# Patient Record
Sex: MALE | Race: AMERICAN INDIAN OR ALASKA NATIVE
[De-identification: names, ages, dates, MRNs, and addresses within clinical notes are randomized per-mention and may not be internally consistent; named-entity substitution may affect disease eponyms.]

---

## 2020-10-12 ENCOUNTER — HOSPITAL ENCOUNTER (EMERGENCY)
Dept: HOSPITAL 5 - ED | Age: 30
Discharge: LEFT BEFORE DISCHARGE | End: 2020-10-12
Payer: SELF-PAY

## 2020-10-12 DIAGNOSIS — Z53.21: ICD-10-CM

## 2020-10-12 DIAGNOSIS — I10: Primary | ICD-10-CM

## 2021-09-26 ENCOUNTER — HOSPITAL ENCOUNTER (EMERGENCY)
Dept: HOSPITAL 5 - ED | Age: 31
LOS: 1 days | Discharge: HOME | End: 2021-09-27
Payer: SELF-PAY

## 2021-09-26 DIAGNOSIS — M62.830: Primary | ICD-10-CM

## 2021-09-26 DIAGNOSIS — M54.5: ICD-10-CM

## 2021-09-26 DIAGNOSIS — J20.9: ICD-10-CM

## 2021-09-26 DIAGNOSIS — K59.00: ICD-10-CM

## 2021-09-26 DIAGNOSIS — Z79.899: ICD-10-CM

## 2021-09-26 PROCEDURE — 99283 EMERGENCY DEPT VISIT LOW MDM: CPT

## 2021-09-26 PROCEDURE — 71046 X-RAY EXAM CHEST 2 VIEWS: CPT

## 2021-09-27 VITALS — DIASTOLIC BLOOD PRESSURE: 96 MMHG | SYSTOLIC BLOOD PRESSURE: 142 MMHG

## 2021-09-27 NOTE — XRAY REPORT
CHEST 2 VIEWS 



INDICATION / CLINICAL INFORMATION: COUGH



STUDY TIME: 0052



COMPARISON: None available.



FINDINGS:



SUPPORT DEVICES: None.



HEART / MEDIASTINUM: No significant abnormality. 



LUNGS / PLEURA: No significant pulmonary or pleural abnormality. No pneumothorax. 



ADDITIONAL FINDINGS: No significant additional findings.





Signer Name: Tom Drew MD 

Signed: 9/27/2021 1:04 AM

Workstation Name: [id number]

## 2021-09-27 NOTE — EMERGENCY DEPARTMENT REPORT
ED General Adult HPI





- General


Chief complaint: Back Pain/Injury


Stated complaint: BACK PAIN/CONSTIPATION/HIGH BP


Source: patient


Mode of arrival: Ambulatory


Limitations: No Limitations





- History of Present Illness


Initial comments: 





Patient is a 31-year-old male with no past medical history except recent 

diagnosis of COVID-19 viral infection who presents to the ED with complaint of 

lack of appetite, persistent dry cough, low back pain and constipation for the 

last 1 week.  Patient states that he has been drinking fluids and eating only 

soup because of lack of appetite and loss of taste sensation.  Patient also 

states that the pain in his low back is persistent especially with movement.  

Patient denies fever, chills, dysuria, urinary frequency and urgency, chest 

pain, shortness of breath, dizziness, headache, abdominal pain or change in 

vision.


MD Complaint: cough, constipation and back pain


-: Sudden, week(s) (1)


Location: chest, back, abdomen


Radiation: non-radiation


Severity scale (0 -10): 4


Quality: aching, constant


Consistency: constant


Improves with: none


Worsens with: movement


Associated Symptoms: denies other symptoms, cough, loss of appetite, malaise.  

denies: confusion, chest pain, diaphoresis, fever/chills, headaches, 

nausea/vomiting, rash, seizure, shortness of breath, syncope, weakness


Treatments Prior to Arrival: none





- Related Data


                                  Previous Rx's











 Medication  Instructions  Recorded  Last Taken  Type


 


Benzonatate [Tessalon Perles] 100 mg PO Q8HR #24 capsule 21 Unknown Rx


 


Docusate Sodium [Dok] 100 mg PO DAILY #30 tablet 21 Unknown Rx


 


Ibuprofen [Motrin] 800 mg PO Q8HR PRN #30 tablet 21 Unknown Rx


 


Magnesium Citrate 295 ml PO ONCE #1 bottle 21 Unknown Rx











                                    Allergies











Allergy/AdvReac Type Severity Reaction Status Date / Time


 


No Known Allergies Allergy   Unverified 21 00:38














ED Review of Systems


ROS: 


Stated complaint: BACK PAIN/CONSTIPATION/HIGH BP


Other details as noted in HPI





Constitutional: denies: chills, fever


Eyes: denies: eye pain, eye discharge, vision change


ENT: denies: ear pain, throat pain


Respiratory: cough.  denies: shortness of breath, wheezing


Cardiovascular: denies: chest pain, palpitations


Endocrine: no symptoms reported


Gastrointestinal: constipation.  denies: abdominal pain, nausea, diarrhea


Genitourinary: denies: urgency, dysuria


Musculoskeletal: back pain, arthralgia, myalgia.  denies: joint swelling


Skin: denies: rash, lesions


Neurological: denies: headache, weakness, paresthesias


Psychiatric: denies: anxiety, depression


Hematological/Lymphatic: denies: easy bleeding, easy bruising





ED Past Medical Hx





- Social History


Smoking Status: Never Smoker


Substance Use Type: None





- Medications


Home Medications: 


                                Home Medications











 Medication  Instructions  Recorded  Confirmed  Last Taken  Type


 


Benzonatate [Tessalon Perles] 100 mg PO Q8HR #24 capsule 21  Unknown Rx


 


Docusate Sodium [Dok] 100 mg PO DAILY #30 tablet 21  Unknown Rx


 


Ibuprofen [Motrin] 800 mg PO Q8HR PRN #30 tablet 21  Unknown Rx


 


Magnesium Citrate 295 ml PO ONCE #1 bottle 21  Unknown Rx














ED Physical Exam





- General


Limitations: No Limitations


General appearance: alert, in no apparent distress





- Head


Head exam: Present: atraumatic, normocephalic, normal inspection





- Eye


Eye exam: Present: normal appearance, PERRL, EOMI


Pupils: Present: normal accommodation





- ENT


ENT exam: Present: normal exam, normal orophraynx, mucous membranes moist, TM's 

normal bilaterally, normal external ear exam





- Neck


Neck exam: Present: normal inspection, full ROM





- Respiratory


Respiratory exam: Present: normal lung sounds bilaterally.  Absent: respiratory 

distress, wheezes, rales, rhonchi, chest wall tenderness, decreased breath 

sounds, prolonged expiratory





- Cardiovascular


Cardiovascular Exam: Present: regular rate, normal rhythm, normal heart sounds. 

 Absent: systolic murmur, diastolic murmur, rubs, gallop





- GI/Abdominal


GI/Abdominal exam: Present: soft, normal bowel sounds.  Absent: tenderness, 

guarding, rebound, hyperactive bowel sounds, hypoactive bowel sounds, 

organomegaly





- Extremities Exam


Extremities exam: Present: normal inspection, full ROM, normal capillary refill





- Back Exam


Back exam: Present: normal inspection, full ROM, tenderness (Palpable lumbosa

cral paraspinal musculoskeletal tenderness), muscle spasm.  Absent: CVA 

tenderness (R), CVA tenderness (L), paraspinal tenderness, vertebral tenderness





- Neurological Exam


Neurological exam: Present: alert, oriented X3, CN II-XII intact, normal gait, 

reflexes normal





- Psychiatric


Psychiatric exam: Present: normal affect, normal mood





- Skin


Skin exam: Present: warm, dry, intact, normal color.  Absent: rash





ED Course





                                   Vital Signs











  21





  00:28 00:33


 


Temperature 98.0 F 98.6 F


 


Respiratory  18





Rate  


 


Blood Pressure  151/90














ED Medical Decision Making





- Radiology Data


Radiology results: report reviewed, image reviewed





[facility]  


                                     [address] [place] [address]  


 


                                            XRay Report   


                                               Signed  


 


Patient: KING ABDIAZIZ CARSON                                                        

        MR#: [id number]  


[id number]          


: 1990                                                                

Acct:[id number]      


 


Age/Sex: 31 / M                                                                

ADM Date: 21     


 


Loc: ED       


Attending Dr:   


 


 


Ordering Physician: JOSEFINA RYAN  


Date of Service: 21  


Procedure(s): XR chest routine 2V  


Accession Number(s): [id number]  


 


cc: JOSEFINA RYAN   


 


Fluoro Time In Minutes:   


 


CHEST 2 VIEWS   


 


 INDICATION / CLINICAL INFORMATION: COUGH  


 


 STUDY TIME: 52  


 


 COMPARISON: None available.  


 


 FINDINGS:  


 


 SUPPORT DEVICES: None.  


 


 HEART / MEDIASTINUM: No significant abnormality.   


 


 LUNGS / PLEURA: No significant pulmonary or pleural abnormality. No 

pneumothorax.   


 


 ADDITIONAL FINDINGS: No significant additional findings.  


 


 


 Signer Name: Tom Drew MD   


 Signed: 2021 1:04 AM  


 Workstation Name: [id number]   


 


 


Transcribed By: GJ  


Dictated By: Tom Drew MD  


Electronically Authenticated By: Tom Drew MD    


Signed Date/Time: 21                                


 


 


 


DD/DT: 21                                                            

  


TD/TT:





























- Medical Decision Making





This is a 31-year-old male with no past medical history except recent diagnosis 

of COVID-19 viral infection who presents to the ED with complaint of lack of 

appetite, persistent dry cough, low back pain and constipation for the last 1 

week.  Patient states that he has been drinking fluids and eating only soup 

because of lack of appetite and loss of taste sensation.  Patient also states 

that the pain in his low back is persistent especially with movement.  In the 

ED, patient is alert and oriented x3 and is not in any distress.  Chest x-ray 

showed no acute cardiopulmonary abnormalities or pneumonitis.  Patient was 

therefore discharged home on medications for pain of his back and constipation 

and was advised to follow-up with his primary care physician in 5 to 7 days for 

reevaluation or return to the ED immediately if symptoms get worse.





- Differential Diagnosis


Constipation; COVID-19; muscle spasm; bronchitis


Critical care attestation.: 


If time is entered above; I have spent that time in minutes in the direct care 

of this critically ill patient, excluding procedure time.








ED Disposition


Clinical Impression: 


 Spasm of muscle of lower back





Acute bronchitis


Qualifiers:


 Bronchitis organism: other organism Qualified Code(s): J20.8 - Acute bronchitis

 due to other specified organisms





Constipation


Qualifiers:


 Constipation type: other constipation type Qualified Code(s): K59.09 - Other 

constipation





Disposition:  HOME / SELF CARE / HOMELESS


Is pt being admited?: No


Does the pt Need Aspirin: No


Condition: Stable


Instructions:  Acute Bronchitis (ED), Muscle Cramps and Spasms, Easy-to-Read, 

Acute Bronchitis, Adult, Easy-to-Read, Constipation, Adult, Easy-to-Read


Additional Instructions: 


Chest x-ray showed no acute cardiopulmonary abnormalities or pneumonitis.  

Therefore take medications with food, drink plenty of fluids and follow-up with 

your primary care physician in 5 to 7 days for reevaluation.  Return to the ED 

immediately if symptoms get worse


Prescriptions: 


Docusate Sodium [Dok] 100 mg PO DAILY #30 tablet


Magnesium Citrate 295 ml PO ONCE #1 bottle


Ibuprofen [Motrin] 800 mg PO Q8HR PRN #30 tablet


 PRN Reason: Pain , Severe (7-10)


Benzonatate [Tessalon Perles] 100 mg PO Q8HR #24 capsule


Referrals: 


[facility] [Provider Group] - 7-10 days


Time of Disposition: 02:35


Print Language: ENGLISH

## 2021-09-29 ENCOUNTER — HOSPITAL ENCOUNTER (EMERGENCY)
Dept: HOSPITAL 5 - ED | Age: 31
Discharge: HOME | End: 2021-09-29
Payer: SELF-PAY

## 2021-09-29 VITALS — SYSTOLIC BLOOD PRESSURE: 144 MMHG | DIASTOLIC BLOOD PRESSURE: 99 MMHG

## 2021-09-29 DIAGNOSIS — Z79.899: ICD-10-CM

## 2021-09-29 DIAGNOSIS — K59.00: ICD-10-CM

## 2021-09-29 DIAGNOSIS — R10.84: ICD-10-CM

## 2021-09-29 DIAGNOSIS — J68.2: Primary | ICD-10-CM

## 2021-09-29 LAB
ALBUMIN SERPL-MCNC: 4.3 G/DL (ref 3.9–5)
ALT SERPL-CCNC: 26 UNITS/L (ref 7–56)
BASOPHILS # (AUTO): 0.1 K/MM3 (ref 0–0.1)
BASOPHILS NFR BLD AUTO: 0.5 % (ref 0–1.8)
BILIRUB DIRECT SERPL-MCNC: < 0.2 MG/DL (ref 0–0.2)
BILIRUB UR QL STRIP: (no result)
BLOOD UR QL VISUAL: (no result)
BUN SERPL-MCNC: 6 MG/DL (ref 9–20)
BUN/CREAT SERPL: 12 %
CALCIUM SERPL-MCNC: 9.2 MG/DL (ref 8.4–10.2)
EOSINOPHIL # BLD AUTO: 0 K/MM3 (ref 0–0.4)
EOSINOPHIL NFR BLD AUTO: 0.4 % (ref 0–4.3)
HCT VFR BLD CALC: 42.6 % (ref 35.5–45.6)
HEMOLYSIS INDEX: 9
HGB BLD-MCNC: 15 GM/DL (ref 11.8–15.2)
LYMPHOCYTES # BLD AUTO: 2 K/MM3 (ref 1.2–5.4)
LYMPHOCYTES NFR BLD AUTO: 21.6 % (ref 13.4–35)
MCHC RBC AUTO-ENTMCNC: 35 % (ref 32–34)
MCV RBC AUTO: 87 FL (ref 84–94)
MONOCYTES # (AUTO): 0.8 K/MM3 (ref 0–0.8)
MONOCYTES % (AUTO): 8.4 % (ref 0–7.3)
PH UR STRIP: 7 [PH] (ref 5–7)
PLATELET # BLD: 241 K/MM3 (ref 140–440)
PROT UR STRIP-MCNC: (no result) MG/DL
RBC # BLD AUTO: 4.93 M/MM3 (ref 3.65–5.03)
RBC #/AREA URNS HPF: < 1 /HPF (ref 0–6)
UROBILINOGEN UR-MCNC: < 2 MG/DL (ref ?–2)
WBC #/AREA URNS HPF: < 1 /HPF (ref 0–6)

## 2021-09-29 PROCEDURE — 96361 HYDRATE IV INFUSION ADD-ON: CPT

## 2021-09-29 PROCEDURE — 82271 OCCULT BLOOD OTHER SOURCES: CPT

## 2021-09-29 PROCEDURE — 80076 HEPATIC FUNCTION PANEL: CPT

## 2021-09-29 PROCEDURE — 85025 COMPLETE CBC W/AUTO DIFF WBC: CPT

## 2021-09-29 PROCEDURE — 81001 URINALYSIS AUTO W/SCOPE: CPT

## 2021-09-29 PROCEDURE — 80048 BASIC METABOLIC PNL TOTAL CA: CPT

## 2021-09-29 PROCEDURE — 83690 ASSAY OF LIPASE: CPT

## 2021-09-29 PROCEDURE — 96374 THER/PROPH/DIAG INJ IV PUSH: CPT

## 2021-09-29 PROCEDURE — 99284 EMERGENCY DEPT VISIT MOD MDM: CPT

## 2021-09-29 PROCEDURE — 74177 CT ABD & PELVIS W/CONTRAST: CPT

## 2021-09-29 PROCEDURE — 36415 COLL VENOUS BLD VENIPUNCTURE: CPT

## 2021-09-29 NOTE — CAT SCAN REPORT
CT ABDOMEN AND PELVIS WITH CONTRAST



INDICATION / CLINICAL INFORMATION: Abdominal pain/constipation.



TECHNIQUE: Axial CT images were obtained through the abdomen and pelvis after Isovue-300, 100 cc IV c
ontrast.  All CT scans at this location are performed using CT dose reduction for ALARA by means of a
utomated exposure control. 



COMPARISON: None available.



FINDINGS:



LOWER CHEST: Mild patchy nodularity at the lung bases has an inflammatory appearance.

LIVER: Mild diffuse fatty infiltration.

GALLBLADDER: No significant abnormality.  

BILE DUCTS: No significant abnormality.

PANCREAS: No significant abnormality.

SPLEEN: No significant abnormality.

ADRENALS: No significant abnormality.

RIGHT KIDNEY / URETER: No significant abnormality.

LEFT KIDNEY / URETER: No significant abnormality.



STOMACH / SMALL BOWEL: No significant abnormality. 

COLON: No significant abnormality. 

APPENDIX: No significant abnormality.  

PERITONEUM: No free fluid. No free air. No fluid collection.

LYMPH NODES: No significant adenopathy.

VASCULAR STRUCTURES: No significant abnormality. 



URINARY BLADDER: No significant abnormality.

REPRODUCTIVE ORGANS: No significant abnormality.



ADDITIONAL FINDINGS: None.



SKELETAL SYSTEM: No significant abnormality.



IMPRESSION:

1. Negative for obstruction or localized inflammation of the abdomen or pelvis.

2. Patchy nodularity at the lung bases is favored to be inflammatory.



Signer Name: Akira Nava MD 

Signed: 9/29/2021 5:41 PM

Workstation Name: [id number]

## 2021-09-29 NOTE — EMERGENCY DEPARTMENT REPORT
<PIPPA BOB - Last Filed: 09/29/21 17:17>





ED GI Bleed HPI





- General


Chief complaint: Abdominal Pain


Stated complaint: RECTAL BLEEDING


Time Seen by Provider: 09/29/21 15:07


Source: patient


Mode of arrival: Ambulatory


Limitations: No Limitations





- History of Present Illness


Initial comments: 


31-year-old male presents to the ER today with complaints of constipation, 

abdominal pain or rectal bleeding.  Patient states that he has been having 

issues with bowel movement since last week Thursday.  Patient states that he 

typically has a bowel movement every day, but since last week Thursday he has 

been feeling constipated.  He states that when he gets the urge to go, only 

small amount of stool comes out, or sometimes nothing comes out only gas.  

Patient states that he has been straining.  He states that the stool has not 

been hard.  He states that he has been noticing bright red blood, and sometimes 

burgundy colored blood on the tissue when he wipes after he has a bowel movement

which he thinks could be related to his hemorrhoids because he was diagnosed by 

a.  He reports associated rectal discomfort and swelling.  He reports associated

abdominal discomfort especially to the right and left side of his abdomen and he

states that the pain gets worse when he gets the urge to have a bowel movement. 

He reports associated low back pain.  Patient states that he was here 2 days ago

for URI symptoms in addition to his complaint of constipation and he was 

prescribed stool softeners which she has been taking but without much relief of 

his symptoms.  He denies any dysuria, hematuria or urgency.  He denies any fever

or chills.  He denies any nausea or vomiting.  He denies any history of 

abdominal surgeries.  He states that he is scheduled to see a GI specialist in 

December for his hemorrhoids.


MD complaint: blood on toilet paper, other (Constipation, abdominal pain )


-: Gradual





- Related Data


                                  Previous Rx's











 Medication  Instructions  Recorded  Last Taken  Type


 


Benzonatate [Tessalon Perles] 100 mg PO Q8HR #24 capsule 09/27/21 Unknown Rx


 


Docusate Sodium [Dok] 100 mg PO DAILY #30 tablet 09/27/21 Unknown Rx


 


Ibuprofen [Motrin] 800 mg PO Q8HR PRN #30 tablet 09/27/21 Unknown Rx


 


Magnesium Citrate 295 ml PO ONCE #1 bottle 09/27/21 Unknown Rx


 


Azithromycin [Zithromax Z-LIBORIO] 0 mg PO DAILY #6 tab 09/29/21 Unknown Rx











                                    Allergies











Allergy/AdvReac Type Severity Reaction Status Date / Time


 


No Known Allergies Allergy   Verified 09/29/21 14:54














ED Review of Systems


Comment: All other systems reviewed and negative


Constitutional: denies: chills, fever


Eyes: denies: eye pain, eye discharge, vision change


ENT: denies: ear pain, throat pain


Respiratory: denies: cough, shortness of breath, SOB with exertion, SOB at rest,

wheezing


Cardiovascular: denies: chest pain, palpitations, dyspnea on exertion, edema, 

syncope, paroxysmal nocturnal dyspnea


Gastrointestinal: abdominal pain, constipation, hematochezia (Rectal pain, 

swelling), other.  denies: nausea, vomiting, diarrhea, hematemesis, melena


Genitourinary: denies: as per HPI, urgency, dysuria, frequency, hematuria, 

discharge, testicular pain, testicular mass


Musculoskeletal: denies: back pain, joint swelling, arthralgia


Skin: denies: rash, lesions, change in color, change in hair/nails, pruritus


Neurological: denies: headache, weakness, numbness, paresthesias, confusion, 

abnormal gait, vertigo


Psychiatric: denies: anxiety, depression, auditory hallucinations, visual 

hallucinations, homicidal thoughts, suicidal thoughts


Hematological/Lymphatic: denies: easy bleeding, easy bruising, swollen glands





ED Past Medical Hx





- Social History


Smoking Status: Never Smoker


Substance Use Type: None





- Medications


Home Medications: 


                                Home Medications











 Medication  Instructions  Recorded  Confirmed  Last Taken  Type


 


Benzonatate [Tessalon Perles] 100 mg PO Q8HR #24 capsule 09/27/21  Unknown Rx


 


Docusate Sodium [Dok] 100 mg PO DAILY #30 tablet 09/27/21  Unknown Rx


 


Ibuprofen [Motrin] 800 mg PO Q8HR PRN #30 tablet 09/27/21  Unknown Rx


 


Magnesium Citrate 295 ml PO ONCE #1 bottle 09/27/21  Unknown Rx


 


Azithromycin [Zithromax Z-LIBORIO] 0 mg PO DAILY #6 tab 09/29/21  Unknown Rx














ED Physical Exam





- General


Limitations: No Limitations


General appearance: alert, in no apparent distress





- Head


Head exam: Present: atraumatic, normocephalic, normal inspection





- Eye


Eye exam: Present: normal appearance, PERRL, EOMI


Pupils: Present: normal accommodation





- ENT


ENT exam: Present: normal exam, mucous membranes moist





- Neck


Neck exam: Present: normal inspection, full ROM





- Respiratory


Respiratory exam: Present: normal lung sounds bilaterally.  Absent: respiratory 

distress, wheezes, rales, rhonchi





- Cardiovascular


Cardiovascular Exam: Present: regular rate, normal rhythm, normal heart sounds





- GI/Abdominal


GI/Abdominal exam: Present: soft, tenderness.  Absent: distended, guarding, 

rebound, rigid





- Rectal


Rectal exam: Present: normal rectal tone, heme (+) stool, other (Chaperone 

present ).  Absent: fecal impaction, hemorrhoids, mass, tenderness





- Neurological Exam


Neurological exam: Present: alert, oriented X3, CN II-XII intact, normal gait





- Psychiatric


Psychiatric exam: Present: normal affect, normal mood





- Skin


Skin exam: Present: intact





ED Medical Decision Making





- Lab Data


Result diagrams: 


                                 09/29/21 15:38





                                 09/29/21 15:38





- Medical Decision Making


The patient's care has been transferred to and accepted by[Filipe GONZALEZ].  We 

discussed: The patient's chief complaints; labs and imaging that have been com

pleted and those that are still pending; any treatment provided and the 

patient's response to treatment; any significant change in condition; the 

treatment plan prior to the transfer of care.  The accepting provider will 

follow up on all pending labs and imaging and make any necessary changes to the 

current impression and/or treatment plan.  The accepting physician/midlevel is 

now responsible for the patient's care and final disposition.








ED Disposition


Clinical Impression: 


 Inflammation of lung





Constipation


Qualifiers:


 Constipation type: unspecified constipation type Qualified Code(s): K59.00 - 

Constipation, unspecified





Abdominal pain


Qualifiers:


 Abdominal location: generalized Qualified Code(s): R10.84 - Generalized 

abdominal pain





Disposition: 01 HOME / SELF CARE / HOMELESS


Condition: Stable


Instructions:  Community-Acquired Pneumonia, Adult, Constipation, Adult


Prescriptions: 


Azithromycin [Zithromax Z-LIBORIO] 0 mg PO DAILY #6 tab


Referrals: 


[facility] [Provider Group] - 3-5 Days


Forms:  Work/School Release Form(ED)





<FILIPE HARDING - Last Filed: 09/29/21 20:39>





ED Review of Systems


ROS: 


Stated complaint: RECTAL BLEEDING


Other details as noted in HPI








ED Course





                                   Vital Signs











  09/29/21





  14:52


 


Temperature 97.7 F


 


Pulse Rate 85


 


Respiratory 16





Rate 


 


Blood Pressure 144/99


 


O2 Sat by Pulse 97





Oximetry 














ED Medical Decision Making





- Lab Data


Result diagrams: 


                                 09/29/21 15:38





                                 09/29/21 15:38








                                   Lab Results











  09/29/21 09/29/21 09/29/21 Range/Units





  15:38 15:38 15:38 


 


WBC  9.2    (4.5-11.0)  K/mm3


 


RBC  4.93    (3.65-5.03)  M/mm3


 


Hgb  15.0    (11.8-15.2)  gm/dl


 


Hct  42.6    (35.5-45.6)  %


 


MCV  87    (84-94)  fl


 


MCH  30    (28-32)  pg


 


MCHC  35 H    (32-34)  %


 


RDW  13.0 L    (13.2-15.2)  %


 


Plt Count  241    (140-440)  K/mm3


 


Lymph % (Auto)  21.6    (13.4-35.0)  %


 


Mono % (Auto)  8.4 H    (0.0-7.3)  %


 


Eos % (Auto)  0.4    (0.0-4.3)  %


 


Baso % (Auto)  0.5    (0.0-1.8)  %


 


Lymph # (Auto)  2.0    (1.2-5.4)  K/mm3


 


Mono # (Auto)  0.8    (0.0-0.8)  K/mm3


 


Eos # (Auto)  0.0    (0.0-0.4)  K/mm3


 


Baso # (Auto)  0.1    (0.0-0.1)  K/mm3


 


Seg Neutrophils %  69.1    (40.0-70.0)  %


 


Seg Neutrophils #  6.3    (1.8-7.7)  K/mm3


 


Sodium   134 L   (137-145)  mmol/L


 


Potassium   3.3 L   (3.6-5.0)  mmol/L


 


Chloride   93.8 L   ()  mmol/L


 


Carbon Dioxide   29   (22-30)  mmol/L


 


Anion Gap   15   mmol/L


 


BUN   6 L   (9-20)  mg/dL


 


Creatinine   0.5 L   (0.8-1.3)  mg/dL


 


Estimated GFR   > 60   ml/min


 


BUN/Creatinine Ratio   12   %


 


Glucose   104 H   ()  mg/dL


 


Calcium   9.2   (8.4-10.2)  mg/dL


 


Total Bilirubin   0.60   (0.1-1.2)  mg/dL


 


Direct Bilirubin   < 0.2   (0-0.2)  mg/dL


 


Indirect Bilirubin   0.4   mg/dL


 


AST   20   (5-40)  units/L


 


ALT   26   (7-56)  units/L


 


Alkaline Phosphatase   60   ()  units/L


 


Total Protein   8.1   (6.3-8.2)  g/dL


 


Albumin   4.3   (3.9-5)  g/dL


 


Albumin/Globulin Ratio   1.1   %


 


Lipase    30  (13-60)  units/L


 


Urine Color     (Yellow)  


 


Urine Turbidity     (Clear)  


 


Urine pH     (5.0-7.0)  


 


Ur Specific Gravity     (1.003-1.030)  


 


Urine Protein     (Negative)  mg/dL


 


Urine Glucose (UA)     (Negative)  mg/dL


 


Urine Ketones     (Negative)  mg/dL


 


Urine Blood     (Negative)  


 


Urine Nitrite     (Negative)  


 


Urine Bilirubin     (Negative)  


 


Urine Urobilinogen     (<2.0)  mg/dL


 


Ur Leukocyte Esterase     (Negative)  


 


Urine WBC (Auto)     (0.0-6.0)  /HPF


 


Urine RBC (Auto)     (0.0-6.0)  /HPF














  09/29/21 Range/Units





  15:59 


 


WBC   (4.5-11.0)  K/mm3


 


RBC   (3.65-5.03)  M/mm3


 


Hgb   (11.8-15.2)  gm/dl


 


Hct   (35.5-45.6)  %


 


MCV   (84-94)  fl


 


MCH   (28-32)  pg


 


MCHC   (32-34)  %


 


RDW   (13.2-15.2)  %


 


Plt Count   (140-440)  K/mm3


 


Lymph % (Auto)   (13.4-35.0)  %


 


Mono % (Auto)   (0.0-7.3)  %


 


Eos % (Auto)   (0.0-4.3)  %


 


Baso % (Auto)   (0.0-1.8)  %


 


Lymph # (Auto)   (1.2-5.4)  K/mm3


 


Mono # (Auto)   (0.0-0.8)  K/mm3


 


Eos # (Auto)   (0.0-0.4)  K/mm3


 


Baso # (Auto)   (0.0-0.1)  K/mm3


 


Seg Neutrophils %   (40.0-70.0)  %


 


Seg Neutrophils #   (1.8-7.7)  K/mm3


 


Sodium   (137-145)  mmol/L


 


Potassium   (3.6-5.0)  mmol/L


 


Chloride   ()  mmol/L


 


Carbon Dioxide   (22-30)  mmol/L


 


Anion Gap   mmol/L


 


BUN   (9-20)  mg/dL


 


Creatinine   (0.8-1.3)  mg/dL


 


Estimated GFR   ml/min


 


BUN/Creatinine Ratio   %


 


Glucose   ()  mg/dL


 


Calcium   (8.4-10.2)  mg/dL


 


Total Bilirubin   (0.1-1.2)  mg/dL


 


Direct Bilirubin   (0-0.2)  mg/dL


 


Indirect Bilirubin   mg/dL


 


AST   (5-40)  units/L


 


ALT   (7-56)  units/L


 


Alkaline Phosphatase   ()  units/L


 


Total Protein   (6.3-8.2)  g/dL


 


Albumin   (3.9-5)  g/dL


 


Albumin/Globulin Ratio   %


 


Lipase   (13-60)  units/L


 


Urine Color  Colorless  (Yellow)  


 


Urine Turbidity  Clear  (Clear)  


 


Urine pH  7.0  (5.0-7.0)  


 


Ur Specific Gravity  1.001 L  (1.003-1.030)  


 


Urine Protein  <15 mg/dl  (Negative)  mg/dL


 


Urine Glucose (UA)  Neg  (Negative)  mg/dL


 


Urine Ketones  Neg  (Negative)  mg/dL


 


Urine Blood  Neg  (Negative)  


 


Urine Nitrite  Neg  (Negative)  


 


Urine Bilirubin  Neg  (Negative)  


 


Urine Urobilinogen  < 2.0  (<2.0)  mg/dL


 


Ur Leukocyte Esterase  Neg  (Negative)  


 


Urine WBC (Auto)  < 1.0  (0.0-6.0)  /HPF


 


Urine RBC (Auto)  < 1.0  (0.0-6.0)  /HPF














- Radiology Data


Radiology results: report reviewed





 


CT ABDOMEN AND PELVIS WITH CONTRAST  


 


 INDICATION / CLINICAL INFORMATION: Abdominal pain/constipation.  


 


 TECHNIQUE: Axial CT images were obtained through the abdomen and pelvis after 

Isovue-300, 100 cc IV


contrast.  All CT scans at this location are performed using CT dose reduction 

for ALARA by means of


automated exposure control.   


 


 COMPARISON: None available.  


 


 FINDINGS:  


 


 LOWER CHEST: Mild patchy nodularity at the lung bases has an inflammatory 

appearance.  


 LIVER: Mild diffuse fatty infiltration.  


 GALLBLADDER: No significant abnormality.    


 BILE DUCTS: No significant abnormality.  


 PANCREAS: No significant abnormality.  


 SPLEEN: No significant abnormality.  


 ADRENALS: No significant abnormality.  


 RIGHT KIDNEY / URETER: No significant abnormality.  


 LEFT KIDNEY / URETER: No significant abnormality.  


 


 STOMACH / SMALL BOWEL: No significant abnormality.   


 COLON: No significant abnormality.   


 APPENDIX: No significant abnormality.    


 PERITONEUM: No free fluid. No free air. No fluid collection.  


 LYMPH NODES: No significant adenopathy.  


 VASCULAR STRUCTURES: No significant abnormality.   


 


 URINARY BLADDER: No significant abnormality.  


 REPRODUCTIVE ORGANS: No significant abnormality.  


 


 ADDITIONAL FINDINGS: None.  


 


 SKELETAL SYSTEM: No significant abnormality.  


 


 IMPRESSION:  


 1. Negative for obstruction or localized inflammation of the abdomen or pelvis.

 


 2. Patchy nodularity at the lung bases is favored to be inflammatory.  





- Medical Decision Making


Patient handed over to me by Pippa Bob PA-C pending CT abdomen results.  

No acute abnormalities are noted in the abdomen on CT, however there are 

inflammatory changes noted bilaterally to the bases of the lungs.  Discussed 

patient findings over the phone with Pippa-agrees with coverage for pneumonia 

given recent Covid infection history.  Patient is otherwise well-appearing and 

his vitals are normal.  His pain is controlled at this time.  She also advised 

that patient continue his stool softeners for constipation and sooner follow-up 

with .  [place] [facility] referral given patient instructed to follow-

up within 3 to 5 days.  Strict return precautions were discussed in detail with 

patient who verbalized understanding.


Critical care attestation.: 


If time is entered above; I have spent that time in minutes in the direct care 

of this critically ill patient, excluding procedure time.








ED Disposition


Is pt being admited?: No